# Patient Record
Sex: MALE | Race: WHITE
[De-identification: names, ages, dates, MRNs, and addresses within clinical notes are randomized per-mention and may not be internally consistent; named-entity substitution may affect disease eponyms.]

---

## 2019-07-21 ENCOUNTER — HOSPITAL ENCOUNTER (EMERGENCY)
Dept: HOSPITAL 59 - ER | Age: 84
Discharge: HOME | End: 2019-07-21
Payer: MEDICARE

## 2019-07-21 DIAGNOSIS — L03.031: Primary | ICD-10-CM

## 2019-07-21 DIAGNOSIS — Z87.891: ICD-10-CM

## 2019-07-21 LAB
ABSOLUTE NEUTROPHIL COUNT: 5.51
ANION GAP SERPL CALC-SCNC: 11 MMOL/L (ref 7–16)
BASOPHILS NFR BLD: 0.3 % (ref 0–6)
BUN SERPL-MCNC: 12 MG/DL (ref 8–23)
CO2 SERPL-SCNC: 27 MMOL/L (ref 22–29)
CREAT SERPL-MCNC: 0.9 MG/DL (ref 0.7–1.2)
EOSINOPHIL NFR BLD: 6.2 % (ref 0–6)
ERYTHROCYTE [DISTWIDTH] IN BLOOD BY AUTOMATED COUNT: 14.9 % (ref 11.5–14.5)
EST GLOMERULAR FILTRATION RATE: > 60 ML/MIN
GLUCOSE SERPL-MCNC: 113 MG/DL (ref 74–109)
GRANULOCYTES NFR BLD: 71.6 % (ref 47–80)
HCT VFR BLD CALC: 38.2 % (ref 42–52)
HGB BLD-MCNC: 11.7 GM/DL (ref 14–18)
INR PPP: 2.2
LYMPHOCYTES NFR BLD AUTO: 12.3 % (ref 16–45)
MCH RBC QN AUTO: 26.6 PG (ref 27–33)
MCHC RBC AUTO-ENTMCNC: 30.6 G/DL (ref 32–36)
MCV RBC AUTO: 87 FL (ref 81–97)
MONOCYTES NFR BLD: 9.6 % (ref 0–9)
PLATELET # BLD: 189 K/UL (ref 130–400)
PMV BLD AUTO: 9.1 FL (ref 7.4–10.4)
PROTHROMBIN TIME: 22.1 SECONDS (ref 9.5–12.1)
RBC # BLD AUTO: 4.39 M/UL (ref 4.4–5.7)
WBC # BLD AUTO: 7.7 K/UL (ref 4.2–12.2)

## 2019-07-21 PROCEDURE — 85379 FIBRIN DEGRADATION QUANT: CPT

## 2019-07-21 PROCEDURE — 85025 COMPLETE CBC W/AUTO DIFF WBC: CPT

## 2019-07-21 PROCEDURE — 85610 PROTHROMBIN TIME: CPT

## 2019-07-21 PROCEDURE — 84550 ASSAY OF BLOOD/URIC ACID: CPT

## 2019-07-21 PROCEDURE — 80048 BASIC METABOLIC PNL TOTAL CA: CPT

## 2019-07-21 PROCEDURE — 99283 EMERGENCY DEPT VISIT LOW MDM: CPT

## 2019-07-21 PROCEDURE — 73660 X-RAY EXAM OF TOE(S): CPT

## 2019-07-21 NOTE — EMERGENCY DEPARTMENT RECORD
History of Present Illness





- General


Chief complaint: Extremity Problem


Stated complaint: RT BIG TOE SWOLLEN


Time Seen by Provider: 19 10:57


Source: Patient


Mode of Arrival: Ambulatory


Limitations: No limitations





- History of Present Illness


Initial comments: 





pt had a knee replacement 6 wks ago.  he continues to have swelling and pain of 

the entire leg and nor his r toe is swollen, hot, red and tender


MD Complaint: Extremity pain, Extremity swelling, Joint pain, Joint swelling


Onset/Timin


-: Days(s)


Location: Right, Foot


History of Same: No


Severity scale (1-10): 7


Quality: Sharp


Consistency: Intermittent


Improves with: Nothing


Worsens with: Nothing


Associated Symptoms: Denies other symptoms





- Related Data


                                  Previous Rx's











 Medication  Instructions  Recorded


 


Cephalexin [Keflex] 500 mg PO TID #21 cap 19


 


Hydrocodone/Acetaminophen [Norco 1 each PO Q6HR #10 tablet 19





5-325 Tablet]  











                                    Allergies











Allergy/AdvReac Type Severity Reaction Status Date / Time


 


No Known Drug Allergies Allergy   Verified 19 10:48














Travel Screening





- Travel/Exposure Within Last 30 Days


Have you traveled within the last 30 days?: No





- Travel/Exposure Within Last Year


Have you traveled outside the [place] in the last year?: No





- Additonal Travel Details


Have you been exposed to anyone with a communicable illness?: No





- Travel Symptoms


Symptom Screening: None





Review of Systems


Reviewed: No additional complaints except as noted below


Constitutional: Reports: As per HPI.  Denies: Chills, Fever, Malaise, Night 

sweats, Weakness, Weight change


Eyes: Reports: As per HPI.  Denies: Eye discharge, Eye pain, Photophobia, Vision

change


ENT: Reports: As per HPI.  Denies: Congestion, Dental pain, Ear pain, Epistaxis,

Hearing loss, Throat pain


Respiratory: Reports: As per HPI.  Denies: Cough, Dyspnea, Hemoptysis, Stridor, 

Wheezes


Cardiovascular: Reports: As per HPI.  Denies: Arrhythmia, Chest pain, Dyspnea on

exertion, Edema, Murmurs, Orthopnea, Palpitations, Paroxysmal nocturnal dyspnea,

Rheumatic Fever, Syncope


Endocrine: Reports: As per HPI.  Denies: Fatigue, Heat or cold intolerance, 

Polydipsia, Polyuria


Gastrointestinal: Reports: As per HPI.  Denies: Abdominal pain, Constipation, 

Diarrhea, Hematemesis, Hematochezia, Melena, Nausea, Vomiting


Genitourinary: Reports: As per HPI.  Denies: Dysuria, Frequency, Hematuria, 

Incontinence, Retention, Testicular pain, Testicular mass, Urgency


Musculoskeletal: Reports: As per HPI.  Denies: Arthralgia, Back pain, Gout, 

Joint swelling, Myalgia, Neck pain


Skin: Reports: As per HPI.  Denies: Bruising, Change in color, Change in 

hair/nails, Lesions, Pruritus, Rash


Neurological: Reports: As per HPI.  Denies: Abnormal gait, Confusion, Headache, 

Numbness, Paresthesias, Seizure, Tingling, Tremors, Vertigo, Weakness


Psychiatric: Reports: As per HPI.  Denies: Anxiety, Auditory hallucinations, 

Depression, Homicidal thoughts, Suicidal thoughts, Visual hallucinations


Hematological/Lymphatic: Reports: As per HPI.  Denies: Anemia, Blood Clots, Easy

bleeding, Easy bruising, Swollen glands





Past Medical History





- SOCIAL HISTORY


Smoking Status: Former smoker


Alcohol Use: Occasional


Drug Use: None





- RESPIRATORY


Hx Respiratory Disorders: Yes


Comment:: blood clot in lungs.





- CARDIOVASCULAR


Hx Cardio Disorders: No





- NEURO


Hx Neuro Disorders: No





- GI


Hx GI Disorders: No





- 


Hx Genitourinary Disorders: Yes


Hx Prostate Problems: Yes





- ENDOCRINE


Hx Endocrine Disorders: No





- MUSCULOSKELETAL


Hx Musculoskeletal Disorders: Yes


Hx Back Injury: Yes





- PSYCH


Hx Psych Problems: No





- HEMATOLOGY/ONCOLOGY


Hx Hematology/Oncology Disorders: Yes


Hx Clotting Problems: Yes


Comment:: blood clots in the left leg and lungs.





Family Medical History


Any Significant Family History?: Yes


Hx Cancer: Father, Brother/Sister





Physical Exam





- General


General Appearance: Alert, Oriented x3, Cooperative, Mild distress





- Head


Head exam: Normal inspection





- Eye


Eye exam: Normal appearance, PERRL, EOMI


Pupils: Normal accommodation





- ENT


ENT exam: Normal exam, Mucous membranes moist, Normal external ear exam, Normal 

orophraynx, TM's normal bilaterally


Ear exam: Normal external inspection.  negative: External canal tenderness


Nasal Exam: Normal inspection.  negative: Discharge, Sinus tenderness


Mouth exam: Normal external inspection, Tongue normal


Teeth exam: Normal inspection.  negative: Dental caries


Throat exam: Normal inspection.  negative: Tonsillar erythema, Tonsillar exudate





- Neck


Neck exam: Normal inspection, Full ROM.  negative: Tenderness





- Respiratory


Respiratory exam: Normal lung sounds bilaterally.  negative: Respiratory 

distress





- Cardiovascular


Cardiovascular Exam: Regular rate, Normal rhythm, Normal heart sounds





- GI/Abdominal


GI/Abdominal exam: Soft, Normal bowel sounds.  negative: Tenderness





- Rectal


Rectal exam: Deferred





- 


 exam: Deferred





- Extremities


Extremities exam: Calf tenderness, Full ROM, Normal capillary refill, 

Tenderness, Other (swelling of entire leg w tendernes.  swelling , erythema and 

tenderness of great toe)





- Back


Back exam: Reports: Normal inspection, Full ROM.  Denies: Muscle spasm, Rash 

noted, Tenderness





- Neurological


Neurological exam: Alert, CN II-XII intact, Normal gait, Oriented X3





- Psychiatric


Psychiatric exam: Normal affect, Normal mood





- Skin


Skin exam: Dry, Intact, Normal color, Warm





Course





                                   Vital Signs











  19





  10:55


 


Temperature 98.1 F


 


Pulse Rate 98 H


 


Respiratory 20





Rate 


 


Blood Pressure 173/80


 


Pulse Ox 93 L








i





- Reevaluation(s)


Reevaluation #1: 





19 13:07


pt is on coumadin and inr is therepeutic





Medical Decision Making





- Lab Data


Result diagrams: 


                                 19 11:30





                                 19 11:30





Disposition


Disposition: Discharge


Clinical Impression: 


Cellulitis


Qualifiers:


 Site of cellulitis: extremity Site of cellulitis of extremity: toe Laterality: 

right Qualified Code(s): L03.031 - Cellulitis of right toe





Disposition: Home, Self-Care


Condition: (1) Good


Instructions:  Cellulitis (ED)


Additional Instructions: 


follow up with family doctor. return sooner if worse.  keep elevated.  warm 

soaks


Prescriptions: 


Hydrocodone/Acetaminophen [Norco 5-325 Tablet] 1 each PO Q6HR #10 tablet


Cephalexin [Keflex] 500 mg PO TID #21 cap


Forms:  Patient Portal Access





Quality





- Quality Measures


Quality Measures: N/A





- Blood Pressure Screening


Does Patient Have Any of the Following: No


Blood Pressure Classification: Pre-Hypertensive BP Reading


Systolic Measurement: 173


Diastolic Measurement: 80


Screening for High Blood Pressure: < Pre-Hypertensive BP, F/U Documented > 

[]


Pre-Hypertensive Follow-up Interventions: Follow-up with rescreen every year.

## 2019-07-22 NOTE — RADIOLOGY REPORT
EXAM:  RIGHT FIRST DIGIT, THREE VIEWS



HISTORY:  PATIENT HAS HAD REDNESS AND SWELLING OF THE FIRST RIGHT TOE FOR TWO 
DAYS.  



TECHNIQUE:  Three views of the right first digit are provided without comparison
examinations.  



FINDINGS:  There is no radiographic evidence of a fracture or dislocation of the
right first digit.  No significant soft tissue abnormalities are visualized.  
Vascular calcification is noted.  No radiopaque foreign bodies are identified.  



Osteopenia of the osseous structures are noted.  



IMPRESSION:  

OSTEOPENIA OF THE OSSEOUS STRUCTURES ARE NOTED WITHOUT RADIOGRAPHIC EVIDENCE OF 
AN ACUTE PROCESS INVOLVING THE RIGHT FIRST DIGIT.  



JOB NUMBER:  [id number]

[facility]